# Patient Record
Sex: FEMALE | Race: WHITE | NOT HISPANIC OR LATINO | Employment: FULL TIME | URBAN - METROPOLITAN AREA
[De-identification: names, ages, dates, MRNs, and addresses within clinical notes are randomized per-mention and may not be internally consistent; named-entity substitution may affect disease eponyms.]

---

## 2017-06-28 ENCOUNTER — APPOINTMENT (OUTPATIENT)
Dept: WOMENS IMAGING | Facility: HOSPITAL | Age: 52
End: 2017-06-28

## 2017-06-28 PROCEDURE — 76641 ULTRASOUND BREAST COMPLETE: CPT | Performed by: RADIOLOGY

## 2017-06-28 PROCEDURE — 77061 BREAST TOMOSYNTHESIS UNI: CPT | Performed by: RADIOLOGY

## 2017-06-28 PROCEDURE — G0279 TOMOSYNTHESIS, MAMMO: HCPCS | Performed by: RADIOLOGY

## 2017-06-28 PROCEDURE — G0206 DX MAMMO INCL CAD UNI: HCPCS | Performed by: RADIOLOGY

## 2017-06-28 PROCEDURE — 77065 DX MAMMO INCL CAD UNI: CPT | Performed by: RADIOLOGY

## 2017-11-14 ENCOUNTER — APPOINTMENT (OUTPATIENT)
Dept: WOMENS IMAGING | Facility: HOSPITAL | Age: 52
End: 2017-11-14

## 2017-11-14 PROCEDURE — 77063 BREAST TOMOSYNTHESIS BI: CPT | Performed by: RADIOLOGY

## 2017-11-14 PROCEDURE — 77067 SCR MAMMO BI INCL CAD: CPT | Performed by: RADIOLOGY

## 2018-11-20 ENCOUNTER — APPOINTMENT (OUTPATIENT)
Dept: WOMENS IMAGING | Facility: HOSPITAL | Age: 53
End: 2018-11-20

## 2018-11-20 PROCEDURE — 77063 BREAST TOMOSYNTHESIS BI: CPT | Performed by: RADIOLOGY

## 2018-11-20 PROCEDURE — 77067 SCR MAMMO BI INCL CAD: CPT | Performed by: RADIOLOGY

## 2019-02-27 ENCOUNTER — TRANSCRIBE ORDERS (OUTPATIENT)
Dept: PHYSICAL THERAPY | Facility: HOSPITAL | Age: 54
End: 2019-02-27

## 2019-02-27 DIAGNOSIS — M25.531 PAIN, WRIST, RIGHT: Primary | ICD-10-CM

## 2019-03-07 ENCOUNTER — TREATMENT (OUTPATIENT)
Dept: PHYSICAL THERAPY | Facility: CLINIC | Age: 54
End: 2019-03-07

## 2019-03-07 DIAGNOSIS — M79.641 PAIN OF RIGHT HAND: Primary | ICD-10-CM

## 2019-03-07 DIAGNOSIS — M79.672 LEFT FOOT PAIN: ICD-10-CM
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
## 2019-03-07 DIAGNOSIS — M25.531 PAIN, WRIST, RIGHT: ICD-10-CM

## 2019-03-07 PROCEDURE — 97161 PT EVAL LOW COMPLEX 20 MIN: CPT | Performed by: PHYSICAL THERAPIST

## 2019-03-07 PROCEDURE — 97110 THERAPEUTIC EXERCISES: CPT | Performed by: PHYSICAL THERAPIST

## 2019-03-07 PROCEDURE — 97530 THERAPEUTIC ACTIVITIES: CPT | Performed by: PHYSICAL THERAPIST
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
## 2019-03-13 ENCOUNTER — TREATMENT (OUTPATIENT)
Dept: PHYSICAL THERAPY | Facility: CLINIC | Age: 54
End: 2019-03-13

## 2019-03-13 DIAGNOSIS — M79.641 PAIN OF RIGHT HAND: Primary | ICD-10-CM
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
## 2019-03-13 DIAGNOSIS — M25.561 ACUTE PAIN OF BOTH KNEES: ICD-10-CM

## 2019-03-13 DIAGNOSIS — M79.672 LEFT FOOT PAIN: ICD-10-CM

## 2019-03-13 DIAGNOSIS — M25.531 PAIN, WRIST, RIGHT: ICD-10-CM

## 2019-03-13 DIAGNOSIS — M25.562 ACUTE PAIN OF BOTH KNEES: ICD-10-CM

## 2019-03-13 PROCEDURE — 97110 THERAPEUTIC EXERCISES: CPT | Performed by: PHYSICAL THERAPIST

## 2019-03-13 NOTE — PROGRESS NOTES
"Physical Therapy Daily Progress Note    Maria A Warren reports: \"My foot feels better -- it still hurts when I walk a lot.  My hand doesn't feel a lot better.  I have been doing the exercises.  My knees aren't too bad; the right one had been feeling sometimes like it wanted to buckle but it hasn't really been feeling like that in the past week or so.  I haven't seen the doctor for my back yet.\"    Subjective     Objective       Tenderness     Additional Tenderness Details  No significant tenderness to palpation (B) knees    Active Range of Motion   Left Knee   Flexion: 128 degrees   Extension: 0 degrees   Extensor la degrees     Right Knee   Flexion: 131 degrees   Extension: 0 degrees   Extensor la degrees     Additional Active Range of Motion Details  (B) knee AROM is painfree    Strength/Myotome Testing     Left Knee   Left knee flexion strength: 5-/5.  Extension: 5  Quadriceps contraction: good    Right Knee   Right knee flexion strength: 5-/5.  Extension: 5  Quadriceps contraction: good    Swelling     Left Knee Girth Measurement (cm)   Joint line: 40.4.    Right Knee Girth Measurement (cm)   Joint line: 40.7.     See Exercise, Manual, and Modality Logs for complete treatment.   *Initiated seated heel raises, hip add elie, quad sets, SLR, and wrist flexor and extensor stretches    Assessment & Plan     Assessment  Assessment details: Patient reports (L) anterior talocrural pain with performance of seated heel raises and open chain plantarflexion, inversion, and eversion. With wrist activities involving abduction of (R) first digit she reports radial wrist discomfort which is consistent with irritation and inflammation of (R) APL.  Issued updated HEP printout to facilitate compliance and recall.        Progress strengthening /stabilization /functional activity. Consider progression of ankle AROM to resisted ankle 4-way.         Manual Therapy:         mins  11746;  Therapeutic Exercise:    39     mins  " 62277;     Neuromuscular Yanique:        mins  17543;    Therapeutic Activity:          mins  40691;     Gait Training:           mins  87355;     Ultrasound:          mins  82655;    Electrical Stimulation:         mins  69918 ( );  Dry Needling          mins self-pay    Timed Treatment:   39   mins   Total Treatment:     45   mins    Evelyn Yeager PT, DPT  Physical Therapist  [place] License # [id number]

## 2019-03-15 ENCOUNTER — TREATMENT (OUTPATIENT)
Dept: PHYSICAL THERAPY | Facility: CLINIC | Age: 54
End: 2019-03-15

## 2019-03-15 DIAGNOSIS — M79.672 LEFT FOOT PAIN: ICD-10-CM

## 2019-03-15 DIAGNOSIS — M79.641 PAIN OF RIGHT HAND: Primary | ICD-10-CM

## 2019-03-15 DIAGNOSIS — M25.562 ACUTE PAIN OF BOTH KNEES: ICD-10-CM

## 2019-03-15 DIAGNOSIS — M25.561 ACUTE PAIN OF BOTH KNEES: ICD-10-CM
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
## 2019-03-15 DIAGNOSIS — M25.531 PAIN, WRIST, RIGHT: ICD-10-CM

## 2019-03-15 PROCEDURE — 97110 THERAPEUTIC EXERCISES: CPT | Performed by: PHYSICAL THERAPIST

## 2019-03-15 PROCEDURE — 97018 PARAFFIN BATH THERAPY: CPT | Performed by: PHYSICAL THERAPIST

## 2019-03-15 NOTE — PROGRESS NOTES
"Physical Therapy Daily Progress Note    Maria A Warren reports: \"My foot is getting better but it still hurts sometimes if I walk too much or stand too long.  I'm not really seeing any improvements along my thumb and thumb side of the wrist. It's awful.\"    Subjective     Objective   See Exercise, Manual, and Modality Logs for complete treatment.   *Introduced paraffin for (R) wrist/hand  *Increased repetitions of quad sets and SLR    Assessment & Plan     Assessment  Assessment details: Patient does not experience any immediate relief or positive response to paraffin application this date.  (B) knee and (L) ankle/foot symptoms are minimal, however (R) radial thumb and wrist c/o persist to high degree, most notably with minimal activation/use of (R) thumb despite variation of forearm alignment (pronation vs neutral vs supination).        Progress per Plan of Care         Manual Therapy:         mins  24534;  Therapeutic Exercise:    28    mins  05957;     Neuromuscular Yanique:        mins  23197;    Therapeutic Activity:          mins  32106;     Gait Training:           mins  57664;     Ultrasound:          mins  36255;    Electrical Stimulation:         mins  56969 ( );  Dry Needling          mins self-pay      Paraffin                       _10_ mins 22895                     Timed Treatment:   28   mins   Total Treatment:     51   mins    Evelyn Yeager PT, DPT  Physical Therapist  [place] License # [id number]    "

## 2019-03-18 ENCOUNTER — TREATMENT (OUTPATIENT)
Dept: PHYSICAL THERAPY | Facility: CLINIC | Age: 54
End: 2019-03-18

## 2019-03-18 DIAGNOSIS — M25.562 ACUTE PAIN OF BOTH KNEES: ICD-10-CM

## 2019-03-18 DIAGNOSIS — M79.672 LEFT FOOT PAIN: ICD-10-CM

## 2019-03-18 DIAGNOSIS — M79.641 PAIN OF RIGHT HAND: Primary | ICD-10-CM

## 2019-03-18 DIAGNOSIS — M25.561 ACUTE PAIN OF BOTH KNEES: ICD-10-CM
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
## 2019-03-18 DIAGNOSIS — M25.531 PAIN, WRIST, RIGHT: ICD-10-CM

## 2019-03-18 PROCEDURE — 97110 THERAPEUTIC EXERCISES: CPT | Performed by: PHYSICAL THERAPIST

## 2019-03-18 NOTE — PROGRESS NOTES
"Physical Therapy Daily Progress Note    Maria A Warren reports: \"My wrist/hand is still hurting really bad, even at rest.  I have been using it a little more at work too.  It doesn't seem to be getting any better. I still don't know that even the ice helps. My knees and (L) foot are doing better than they were.  My foot still hurts across the front of the ankle when I point my toes down.\"    Subjective Evaluation    Pain  Pain scale: 0/10 (L) foot, 0/10 (B) knees, 8/10 (R) hand/wrist.           Objective   See Exercise, Manual, and Modality Logs for complete treatment.   *Initiated standing [facility]    Assessment & Plan     Assessment  Assessment details: (B) knee and (L) foot/ankle symptoms appear to be steadily improving, though (R) wrist and hand do not.  The latter does not yet seem to be responding to basic ROM/stretching and anti-inflammatory modalities as a typical soft tissue injury. Patient has recently started her newly prescribed dose of Motrin without discernible benefit yet.        Progress per Plan of Care. Brief reassessment prior to RTMD on Thursday, 03/21/19.         Manual Therapy:         mins  72319;  Therapeutic Exercise:    31     mins  94749;     Neuromuscular Yanique:        mins  61342;    Therapeutic Activity:          mins  15374;     Gait Training:           mins  14309;     Ultrasound:          mins  80322;    Electrical Stimulation:         mins  76429 ( );  Dry Needling          mins self-pay    Timed Treatment:   31   mins   Total Treatment:     36   mins    Evelyn Yeager PT, DPT  Physical Therapist  [place] License # [id number]    "

## 2019-03-20 ENCOUNTER — TREATMENT (OUTPATIENT)
Dept: PHYSICAL THERAPY | Facility: CLINIC | Age: 54
End: 2019-03-20

## 2019-03-20 DIAGNOSIS — M25.561 ACUTE PAIN OF BOTH KNEES: ICD-10-CM

## 2019-03-20 DIAGNOSIS — M25.562 ACUTE PAIN OF BOTH KNEES: ICD-10-CM

## 2019-03-20 DIAGNOSIS — M79.641 PAIN OF RIGHT HAND: Primary | ICD-10-CM
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
## 2019-03-20 DIAGNOSIS — M25.531 PAIN, WRIST, RIGHT: ICD-10-CM

## 2019-03-20 DIAGNOSIS — M79.672 LEFT FOOT PAIN: ICD-10-CM

## 2019-03-20 PROCEDURE — 97110 THERAPEUTIC EXERCISES: CPT | Performed by: PHYSICAL THERAPIST

## 2019-03-20 PROCEDURE — 97530 THERAPEUTIC ACTIVITIES: CPT | Performed by: PHYSICAL THERAPIST

## 2019-03-20 NOTE — PROGRESS NOTES
"Physical Therapy Progress Note    3/20/2019  Osmel Woods MD    Re: Maria A Warren  ________________________________________________________________    Ms. Maria A Warren, has attended 5 PT sessions.  Treatment has consisted of: patient education, therapeutic activity, therapeutic exercise, and modalities.     S: Ms. Maria A Warren states: \"My left foot and my knees are feeling pretty good.  I have been able to increase my walking to at least 3/4 mile and my foot just throbbed a little afterward.  My hand just doesn't doesn't seem to be getting any better.  The pain is constant and any movement of my thumb makes the pain awful.\"    Subjective Evaluation    Pain  Pain scale: 6.5/10 (R) wrist/hand, 1/10 (L) medial ankle, 0/10 (B) knees.           Objective       Tenderness     Additional Tenderness Details  Mod (+) TTP (R) radial wrist    Active Range of Motion     Right Wrist   Wrist flexion: 71 degrees   Wrist extension: 52 degrees with pain  Radial deviation: 15 degrees with pain  Ulnar deviation: 42 degrees   Left Knee   Flexion: 131 degrees   Extension: 0 degrees   Extensor la degrees     Right Knee   Flexion: 133 degrees   Extension: 0 degrees   Extensor la degrees   Left Ankle/Foot   Dorsiflexion (ke): 5 degrees   Plantar flexion: 55 degrees   Inversion: 27 degrees   Eversion: 12 degrees     Additional Active Range of Motion Details  (L) ankle DF and eversion mild discomfort medial ankle  (L) ankle PF mild discomfort anterior talocrural joint    Strength/Myotome Testing     Right Wrist/Hand   Wrist extension: 4-  Wrist flexion: 4  Radial deviation: 4-  Ulnar deviation: 4+     (2nd hand position)     Trial 1: 12 lbs    Trial 2: 16 lbs    Trial 3: 8 lbs    Average: 12 lbs    Left Knee   Flexion: 5  Extension: 5  Quadriceps contraction: good    Right Knee   Flexion: 5  Extension: 5  Quadriceps contraction: good    Left Ankle/Foot   Left ankle dorsiflexion strength: 5-/5.  Left " ankle plantar flexion strength: 5-/5.  Inversion: 4+  Eversion: 4+     See Exercise, Manual, and Modality Logs for complete treatment.       Assessment & Plan     Assessment  Assessment details: Patient has been compliant and cooperative with PT efforts.  She verbalizes good improvements in (L) foot and (B) knee symptoms, all of which appear to be resolving as expected with time and treatment/intervention.  She is able to successfully increase functional mobility without significant increase in symptoms.  Her (R) hand and wrist pain, however, does not appear to be improving to any notable extent and significantly limits her ADLs.  This is focused at her (R) radial wrist and specifically triggered by active motion of (R) first digit.  This is of concern and may warrant further investigation at this time, pending your assessment.        Awaiting MD orders (patient RTMD tomorrow, 03/21/19).         Manual Therapy:         mins  40732;  Therapeutic Exercise:   26     mins  55976;     Neuromuscular Yanique:        mins  80449;    Therapeutic Activity:     12     mins  20488;     Gait Training:           mins  24715;     Ultrasound:          mins  46937;    Electrical Stimulation:         mins  45982 ( );  Dry Needling          mins self-pay    Timed Treatment:   38   mins   Total Treatment:     39   mins    Evelyn Yeager PT, DPT  Physical Therapist  [place] License # [id number]

## 2019-03-22 ENCOUNTER — TREATMENT (OUTPATIENT)
Dept: PHYSICAL THERAPY | Facility: CLINIC | Age: 54
End: 2019-03-22

## 2019-03-22 DIAGNOSIS — M79.672 LEFT FOOT PAIN: ICD-10-CM

## 2019-03-22 DIAGNOSIS — M79.641 PAIN OF RIGHT HAND: Primary | ICD-10-CM
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
## 2019-03-22 DIAGNOSIS — M25.531 PAIN, WRIST, RIGHT: ICD-10-CM

## 2019-03-22 DIAGNOSIS — M25.562 ACUTE PAIN OF BOTH KNEES: ICD-10-CM

## 2019-03-22 DIAGNOSIS — M25.561 ACUTE PAIN OF BOTH KNEES: ICD-10-CM

## 2019-03-22 PROCEDURE — 97035 APP MDLTY 1+ULTRASOUND EA 15: CPT | Performed by: PHYSICAL THERAPIST

## 2019-03-22 PROCEDURE — 97110 THERAPEUTIC EXERCISES: CPT | Performed by: PHYSICAL THERAPIST

## 2019-03-22 NOTE — PROGRESS NOTES
"Physical Therapy Daily Progress Note    Maria A Warren reports: \"I saw Dr. Woods yesterday and he wants me to focus on the hand for a couple more therapy visits and if it's not getting better by then he'll refer me to a specialist.\"    Subjective Evaluation    Pain  Current pain ratin  Location: (R) radial wrist and first digit, dorsal central wrist           Objective   See Exercise, Manual, and Modality Logs for complete treatment.   *Increased repetitions of wrist/hand activities  *Initiated ultrasound treatment     Assessment & Plan     Assessment  Assessment details: Patient returns to PT with persistent pain and limitation in (R) wrist and hand AROM, most notably of the first digit.  Symptoms appear centralized over the (R) radial wrist, APL, and to lesser extent dorsal central wrist.  She responds positively to first ultrasound treatment, however, reporting slight improvement in symptoms during and immediately following treatment.        Other - assess response to ultrasound.         Manual Therapy:         mins  39878;  Therapeutic Exercise:   16     mins  76950;     Neuromuscular Yanique:        mins  11395;    Therapeutic Activity:          mins  17696;     Gait Training:           mins  69132;     Ultrasound:     8     mins  15827;    Electrical Stimulation:         mins  28982 ( );  Dry Needling          mins self-pay    Timed Treatment:   24   mins   Total Treatment:     24   mins    Evelyn Yeager PT, DPT  Physical Therapist  [place] License # [id number]    "

## 2019-03-27 ENCOUNTER — TREATMENT (OUTPATIENT)
Dept: PHYSICAL THERAPY | Facility: CLINIC | Age: 54
End: 2019-03-27

## 2019-03-27 DIAGNOSIS — M79.641 PAIN OF RIGHT HAND: Primary | ICD-10-CM

## 2019-03-27 DIAGNOSIS — M25.531 PAIN, WRIST, RIGHT: ICD-10-CM

## 2019-03-27 PROCEDURE — 97110 THERAPEUTIC EXERCISES: CPT | Performed by: PHYSICAL THERAPIST

## 2019-03-27 NOTE — PROGRESS NOTES
"Physical Therapy Daily Progress Note    Visit #7    Maria A Warren reports: \"My wrist/hand is not any better.  The ultrasound only helped for a few minutes, basically until I started moving it again.  I still have constant pain in my wrist and thumb and the pain is worse with any movement of my thumb or wrist.\"    Subjective Evaluation    Pain  Current pain ratin (R) radial wrist, proximal 1st metacarpal, and dorsal wrist           Objective   See Exercise, Manual, and Modality Logs for complete treatment.       Assessment & Plan     Assessment  Assessment details: Despite patient attendance at 7/7 PT sessions, subjective reports (R) wrist/first digit remain elevated beyond what would typically be expected at this point. Patient's tolerance to exercise progression is very limited and she has been unable to obtain significant relief with any modality offered (ice, paraffin, electrical stimulation, and ultrasound).  Provoking activities remain unchanged, namely wrist and thumb AROM.  At this point it is becoming concerning that patient's wrist does not appear to be healing at the appropriate rate that would be typically expected after a strain-type of injury.  It may be beneficial to proceed with referral and/or imaging for this patient at this time.        Awaiting MD orders - patient RTMD tomorrow, 19.         Manual Therapy:         mins  87710;  Therapeutic Exercise:    14     mins  30198;     Neuromuscular Yanique:        mins  37716;    Therapeutic Activity:          mins  43837;     Gait Training:           mins  91076;     Ultrasound:          mins  38163;    Electrical Stimulation:         mins  20883 ( );  Dry Needling          mins self-pay    Timed Treatment:   14   mins   Total Treatment:     14   mins    Evelyn Yeager PT, DPT  Physical Therapist  [place] License # [id number]    "

## 2019-04-07 ENCOUNTER — APPOINTMENT (OUTPATIENT)
Dept: GENERAL RADIOLOGY | Facility: HOSPITAL | Age: 54
End: 2019-04-07

## 2019-04-07 ENCOUNTER — HOSPITAL ENCOUNTER (EMERGENCY)
Facility: HOSPITAL | Age: 54
Discharge: HOME | End: 2019-04-07
Attending: EMERGENCY MEDICINE | Admitting: EMERGENCY MEDICINE

## 2019-04-07 VITALS
OXYGEN SATURATION: 100 % | TEMPERATURE: 99.4 F | RESPIRATION RATE: 15 BRPM | HEIGHT: 64 IN | WEIGHT: 164 LBS | BODY MASS INDEX: 28 KG/M2 | SYSTOLIC BLOOD PRESSURE: 117 MMHG | HEART RATE: 80 BPM | DIASTOLIC BLOOD PRESSURE: 72 MMHG

## 2019-04-07 DIAGNOSIS — S13.9XXA NECK SPRAIN, INITIAL ENCOUNTER: Primary | ICD-10-CM

## 2019-04-07 PROCEDURE — 99282 EMERGENCY DEPT VISIT SF MDM: CPT

## 2019-04-07 PROCEDURE — 72050 X-RAY EXAM NECK SPINE 4/5VWS: CPT

## 2019-04-07 RX ORDER — LEVOTHYROXINE SODIUM 0.05 MG/1
50 TABLET ORAL DAILY
COMMUNITY

## 2019-04-07 RX ORDER — NAPROXEN 500 MG/1
500 TABLET ORAL 2 TIMES DAILY WITH MEALS
Qty: 30 TABLET | Refills: 0 | Status: SHIPPED | OUTPATIENT
Start: 2019-04-07

## 2019-04-07 RX ORDER — CYCLOBENZAPRINE HCL 10 MG
10 TABLET ORAL 3 TIMES DAILY PRN
Qty: 12 TABLET | Refills: 0 | Status: SHIPPED | OUTPATIENT
Start: 2019-04-07

## 2019-04-07 RX ORDER — IBUPROFEN 800 MG/1
800 TABLET ORAL EVERY 6 HOURS PRN
COMMUNITY
End: 2019-04-07
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
## 2019-12-03 ENCOUNTER — APPOINTMENT (OUTPATIENT)
Dept: WOMENS IMAGING | Facility: HOSPITAL | Age: 54
End: 2019-12-03

## 2019-12-03 PROCEDURE — 77063 BREAST TOMOSYNTHESIS BI: CPT | Performed by: RADIOLOGY

## 2019-12-03 PROCEDURE — 77067 SCR MAMMO BI INCL CAD: CPT | Performed by: RADIOLOGY

## 2020-01-29 ENCOUNTER — DOCUMENTATION (OUTPATIENT)
Dept: PHYSICAL THERAPY | Facility: CLINIC | Age: 55
End: 2020-01-29

## 2020-03-24 ENCOUNTER — APPOINTMENT (OUTPATIENT)
Dept: WOMENS IMAGING | Facility: HOSPITAL | Age: 55
End: 2020-03-24

## 2020-03-24 PROCEDURE — 76641 ULTRASOUND BREAST COMPLETE: CPT | Performed by: RADIOLOGY

## 2020-03-24 PROCEDURE — 77065 DX MAMMO INCL CAD UNI: CPT | Performed by: RADIOLOGY

## 2020-03-24 PROCEDURE — G0279 TOMOSYNTHESIS, MAMMO: HCPCS | Performed by: RADIOLOGY

## 2020-03-24 PROCEDURE — 77061 BREAST TOMOSYNTHESIS UNI: CPT | Performed by: RADIOLOGY

## 2020-12-08 ENCOUNTER — APPOINTMENT (OUTPATIENT)
Dept: WOMENS IMAGING | Facility: HOSPITAL | Age: 55
End: 2020-12-08

## 2020-12-08 PROCEDURE — 77067 SCR MAMMO BI INCL CAD: CPT | Performed by: RADIOLOGY

## 2020-12-08 PROCEDURE — 77063 BREAST TOMOSYNTHESIS BI: CPT | Performed by: RADIOLOGY

## 2022-01-26 ENCOUNTER — APPOINTMENT (OUTPATIENT)
Dept: WOMENS IMAGING | Facility: HOSPITAL | Age: 57
End: 2022-01-26

## 2022-01-26 PROCEDURE — 77067 SCR MAMMO BI INCL CAD: CPT | Performed by: RADIOLOGY

## 2022-01-26 PROCEDURE — 77063 BREAST TOMOSYNTHESIS BI: CPT | Performed by: RADIOLOGY
